# Patient Record
Sex: FEMALE | Race: WHITE
[De-identification: names, ages, dates, MRNs, and addresses within clinical notes are randomized per-mention and may not be internally consistent; named-entity substitution may affect disease eponyms.]

---

## 2018-05-14 ENCOUNTER — HOSPITAL ENCOUNTER (OUTPATIENT)
Dept: HOSPITAL 62 - SP | Age: 73
End: 2018-05-14
Attending: INTERNAL MEDICINE
Payer: MEDICARE

## 2018-05-14 DIAGNOSIS — M25.572: Primary | ICD-10-CM

## 2018-05-14 DIAGNOSIS — R22.43: ICD-10-CM

## 2018-05-14 PROCEDURE — 93970 EXTREMITY STUDY: CPT

## 2018-05-14 NOTE — RADIOLOGY REPORT (SQ)
EXAM DESCRIPTION:  ANKLE LEFT AP/LATERAL



COMPLETED DATE/TIME:  5/14/2018 10:58 am



REASON FOR STUDY:  M25.572 PAIN IN LEFT ANKLE AND JOINTS OF LEFT FOOT R22.43  LOCALIZED SWELLING, MAS
S AND LUMP, LOWER LIMB, BILATE M25.572  PAIN IN LEFT ANKLE AND JOINTS OF LEFT FOOT



COMPARISON:  Left ankle films 7/24/2014



NUMBER OF VIEWS:  Three views.



TECHNIQUE:  AP, lateral, and oblique radiographic images acquired of the left ankle.



LIMITATIONS:  None.



FINDINGS:  MINERALIZATION: Osteopenic

BONES: No acute fracture or dislocation.  Small plantar calcaneal spur.

JOINTS: No definite ankle joint effusion.  No disruption of the ankle mortise.

SOFT TISSUES: There is diffuse medial and lateral soft tissue swelling

OTHER: No other significant finding.



IMPRESSION:  Diffuse soft tissue swelling.  No acute fracture.  No disruption of the ankle mortise



TECHNICAL DOCUMENTATION:  JOB ID:  [id number]

 2011 [employer]- All Rights Reserved



Reading location - IP/workstation name: [email or web address]

## 2018-05-14 NOTE — XCELERA REPORT
[address] [address]

                             Tel: [phone]

                             Fax: [phone]



                     Lower Extremity Venous Evaluation

____________________________________________________________________________



Name: BALDEV GANNON

MRN: [id number]                Age: 72 yrs

Gender: Female                 : 1945

Patient Status: Outpatient     Patient Location: 

Account #: [id number]

Study Date: 2018 11:09 AM

Accession #: [id number]

____________________________________________________________________________



Procedure: Color flow and duplex imaging bilaterally of the veins of the

lower extremities as well as the Common Femoral veins.

Reason For Study: BLE SWELLING





Ordering Physician: SABRA FORMAN

Performed By: Noris Atwood

____________________________________________________________________________



____________________________________________________________________________





Right Sided Venous Evaluation

Normal vessel filling wall to wall, compression and augmentation as well as

Colour flow down to the infrageniculate veins.



Left Sided Venous Evaluation

Normal vessel filling wall to wall, compression and augmentation as well as

Colour flow down to the infrageniculate veins.

____________________________________________________________________________





Interpretation Summary

No duplex evidence of DVT or obstruction in the bilateral lower

extremities.

____________________________________________________________________________



____________________________________________________________________________



Electronically signed by:      Lennox Williams      on 2018 06:59 PM



CC: SABRA FORMAN

>

Williams, Lennox

## 2018-05-31 ENCOUNTER — HOSPITAL ENCOUNTER (OUTPATIENT)
Dept: HOSPITAL 62 - WI | Age: 73
End: 2018-05-31
Attending: INTERNAL MEDICINE
Payer: MEDICARE

## 2018-05-31 DIAGNOSIS — M81.0: Primary | ICD-10-CM

## 2018-05-31 PROCEDURE — 77080 DXA BONE DENSITY AXIAL: CPT

## 2018-05-31 NOTE — WOMENS IMAGING REPORT
EXAM DESCRIPTION:  BONE DENSITY HIP/SPINE



COMPLETED DATE/TIME:  5/31/2018 11:16 am



REASON FOR STUDY:  AGE-RELATED OSTEOPROSIS; M81.0 M81.0  AGE-RELATED OSTEOPOROSIS W/O CURRENT PATHOLO
GICAL FRAC



COMPARISON:   None.



TECHNIQUE:  Dual-Energy X-ray Absorptiometry (DEXA) of the AP Spine and Hip.



LIMITATIONS:  None.



FINDINGS:  LUMBAR SPINE:

The bone mineral density (BMD) measured from L1-L4 in the AP projection correlates with a T-score of 
-0.6, which is normal as defined by the World Health Organization.

HIP:

The bone mineral density (BMD) measured in the left hip correlates with a T-score of -2.3 in the femo
ral neck, which is osteopenia as defined by the World Health Organization.



IMPRESSION:  1. LUMBAR SPINE: Normal

2.  HIP: Osteopenia



COMMENT:  The patient's 10 year risk of major osteoporotic fracture is 18%.  Her 10 year risk of hip 
fracture is 5.1%.

The World Health Organization defines low BMD as follows:

T-score:

Normal:  Greater than -1.0

Osteopenia: Between -1.0 and -2.5

Osteoporosis:  Less than -2.5 without fractures

Established osteoporosis:  Less than -2.5 with fractures

In general, you may wish to consider:

Diagnosis          Treatment                     Follow-up DEXA

Normal BMD      Prevention                    2-3 years

Osteopenia       Prevention/Therapy        1-2 years

Osteoporosis     Therapy                        Yearly



TECHNICAL DOCUMENTATION:  JOB ID:  [id number]

 2011 [employer]- All Rights Reserved



Reading location - IP/workstation name: [email or web address]